# Patient Record
Sex: MALE | Race: WHITE
[De-identification: names, ages, dates, MRNs, and addresses within clinical notes are randomized per-mention and may not be internally consistent; named-entity substitution may affect disease eponyms.]

---

## 2018-08-20 ENCOUNTER — HOSPITAL ENCOUNTER (EMERGENCY)
Dept: HOSPITAL 5 - ED | Age: 43
Discharge: LEFT BEFORE DISCHARGE | End: 2018-08-20
Payer: SELF-PAY

## 2018-08-20 VITALS — SYSTOLIC BLOOD PRESSURE: 129 MMHG | DIASTOLIC BLOOD PRESSURE: 76 MMHG

## 2018-08-20 DIAGNOSIS — Z53.21: ICD-10-CM

## 2018-08-20 DIAGNOSIS — M79.641: Primary | ICD-10-CM

## 2018-08-20 NOTE — XRAY REPORT
FINAL REPORT



EXAM:  XR HAND 3+V RT



HISTORY:  pain/swelling r/t  puncture wound from nail old injury

4th metacarpal. New injury to the base of the index finger



TECHNIQUE:  Three views right hand



Comparison: None



FINDINGS:  

Old fracture base 4th metacarpal.



No fracture or dislocation of the index finger. No radiopaque

foreign body or soft tissue gas.



There is an ill-defined irregularity of the mid to distal 1st

metacarpal shaft cortex which appears to be an artifact on the

oblique image.



IMPRESSION:  

Old comminuted fracture of the base of the 4th metacarpal.



Irregularity of the cortex of the 1st metacarpal shaft on the

oblique image only appears to be artifactual. No radiopaque

foreign body, soft tissue gas or fracture.